# Patient Record
Sex: FEMALE | Race: WHITE | NOT HISPANIC OR LATINO
[De-identification: names, ages, dates, MRNs, and addresses within clinical notes are randomized per-mention and may not be internally consistent; named-entity substitution may affect disease eponyms.]

---

## 2019-08-16 PROBLEM — Z00.00 ENCOUNTER FOR PREVENTIVE HEALTH EXAMINATION: Status: ACTIVE | Noted: 2019-08-16

## 2019-09-04 ENCOUNTER — APPOINTMENT (OUTPATIENT)
Dept: OBGYN | Facility: CLINIC | Age: 44
End: 2019-09-04

## 2021-06-11 ENCOUNTER — TRANSCRIPTION ENCOUNTER (OUTPATIENT)
Age: 46
End: 2021-06-11

## 2022-01-31 ENCOUNTER — OUTPATIENT (OUTPATIENT)
Dept: OUTPATIENT SERVICES | Facility: HOSPITAL | Age: 47
LOS: 1 days | End: 2022-01-31
Payer: COMMERCIAL

## 2022-01-31 ENCOUNTER — APPOINTMENT (OUTPATIENT)
Dept: MAMMOGRAPHY | Facility: IMAGING CENTER | Age: 47
End: 2022-01-31
Payer: COMMERCIAL

## 2022-01-31 DIAGNOSIS — Z00.8 ENCOUNTER FOR OTHER GENERAL EXAMINATION: ICD-10-CM

## 2022-01-31 PROCEDURE — 76642 ULTRASOUND BREAST LIMITED: CPT | Mod: 26

## 2022-01-31 PROCEDURE — 76642 ULTRASOUND BREAST LIMITED: CPT

## 2022-01-31 PROCEDURE — 77066 DX MAMMO INCL CAD BI: CPT

## 2022-01-31 PROCEDURE — G0279: CPT

## 2022-02-02 ENCOUNTER — APPOINTMENT (OUTPATIENT)
Dept: ULTRASOUND IMAGING | Facility: IMAGING CENTER | Age: 47
End: 2022-02-02

## 2023-04-03 ENCOUNTER — APPOINTMENT (OUTPATIENT)
Dept: ORTHOPEDIC SURGERY | Facility: CLINIC | Age: 48
End: 2023-04-03
Payer: COMMERCIAL

## 2023-04-03 VITALS — HEIGHT: 58 IN | BODY MASS INDEX: 23.09 KG/M2 | WEIGHT: 110 LBS

## 2023-04-03 DIAGNOSIS — I10 ESSENTIAL (PRIMARY) HYPERTENSION: ICD-10-CM

## 2023-04-03 DIAGNOSIS — Z78.9 OTHER SPECIFIED HEALTH STATUS: ICD-10-CM

## 2023-04-03 DIAGNOSIS — S83.512A SPRAIN OF ANTERIOR CRUCIATE LIGAMENT OF LEFT KNEE, INITIAL ENCOUNTER: ICD-10-CM

## 2023-04-03 DIAGNOSIS — S83.232A COMPLEX TEAR OF MEDIAL MENISCUS, CURRENT INJURY, LEFT KNEE, INITIAL ENCOUNTER: ICD-10-CM

## 2023-04-03 PROCEDURE — 73564 X-RAY EXAM KNEE 4 OR MORE: CPT | Mod: LT

## 2023-04-03 PROCEDURE — 99204 OFFICE O/P NEW MOD 45 MIN: CPT

## 2023-04-03 RX ORDER — AMLODIPINE BESYLATE AND OLMESARTAN MEDOXOMIL 5; 20 MG/1; MG/1
5-20 TABLET, FILM COATED ORAL
Refills: 0 | Status: ACTIVE | COMMUNITY

## 2023-04-03 RX ORDER — LEVOTHYROXINE SODIUM 50 UG/1
50 TABLET ORAL
Refills: 0 | Status: ACTIVE | COMMUNITY

## 2023-04-04 NOTE — HISTORY OF PRESENT ILLNESS
[de-identified] : Patient is a 48 year old female here for left knee pain. Patient was skiing on 3/3/23 and had an accident, injuring the knee. Patient went to [facility] where they told her she had torn her meniscus. Patient states they were making her wait until 4/27 to perform surgery on the knee. Patient was told by her physical therapist at [facility]  to see Dr. Esparza today. Patient received an MRI for the knee on 3/8/23. Patient notes weakness in the knee. Patient notes no prior injury to the left knee.

## 2023-04-04 NOTE — DATA REVIEWED
[MRI] : MRI [Left] : left [Knee] : knee [Report was reviewed and noted in the chart] : The report was reviewed and noted in the chart [I independently reviewed and interpreted images and report] : I independently reviewed and interpreted images and report [I reviewed the films/CD] : I reviewed the films/CD [FreeTextEntry1] : 03.08.23 ( [facility]) - Complete tear of the ACL, complex posterior horn/body mmt, bone contusions of the lateral tibial plateau and LFC. mild djd.

## 2023-04-04 NOTE — PHYSICAL EXAM
[Left] : left knee [NL (0)] : extension 0 degrees [] : negative Patella apprehension [TWNoteComboBox7] : flexion 130 degrees

## 2023-04-04 NOTE — DISCUSSION/SUMMARY
[Medication Risks Reviewed] : Medication risks reviewed [Surgical risks reviewed] : Surgical risks reviewed [de-identified] : Complete tear of the ACL and complex mmt of the left knee s/p skiing injury on 03.03.23. Persistent pain and instability of the left knee.\par Would advise for continued use of the brace. \par \par risks and benefits of acl reconstruction discussed, explained its a major surgery, expect great outcome but real risks that include infection, graft failure/recurrent tearing, continued knee pain, inability to return to sports, graft site donor issues such as fracture, also discussed graft choices and risks of each and how growth plates are a factor but patient  is within our guidelines for bone patella bone which were established by jayme, risk of arthritis with meniscectomy and recurrent tearing 25% of time with repair, that myriad of poor events can even be limb threatening and need to adhere to our protocol to get desired results, wants to proceed.  Understand this is major surgery which can be limb threatening with unforeseen complications \par \par discussed risks and benefits of acl reconstruction as well as graft choices and options, understands despite well done surgery and good rehab there is a re tear rate of almost 20% in national studies, there is a risk of need for repeat surgery for the acl and meniscus as well as the devastating complication of infection, there are risks from havesting of the graft and we weigh these against the risks of allograft, while most athlets can return to sports , many cannot, this surgery requires the patient to be diligent in rehab, there are also risks of the anesthesia particularly the nerve block which can be discussed with anesthesia, there is a risk of hardware loosening and breakage and loss of fixation, the goal of surgery is a stable knee though i don’t attempt to produce natural anatomy as i think that has been key to our lower revision rates as taught by jayme \par \par The risks and benefits of surgery have been discussed. Risks include but are not limited to bleeding, infection, reaction to anesthesia, injury to blood vessels and nerves, malunion, nonunion, DVT, PE, necessity of repeat surgery, chronic pain, loss of limb and death. The patient understands the risks and agrees with the surgical plan. All questions have been answered.

## 2023-04-10 ENCOUNTER — APPOINTMENT (OUTPATIENT)
Age: 48
End: 2023-04-10
Payer: COMMERCIAL

## 2023-04-10 PROCEDURE — 29888 ARTHRS AID ACL RPR/AGMNTJ: CPT | Mod: AS,LT

## 2023-04-10 PROCEDURE — 20902 REMOVAL OF BONE FOR GRAFT: CPT | Mod: AS,59,LT

## 2023-04-10 PROCEDURE — 29879 ARTHRS KNE SRG ABRASJ ARTHRP: CPT | Mod: 59,LT

## 2023-04-10 PROCEDURE — 29879 ARTHRS KNE SRG ABRASJ ARTHRP: CPT | Mod: AS,59,LT

## 2023-04-10 PROCEDURE — 29881 ARTHRS KNE SRG MNISECTMY M/L: CPT | Mod: AS,59,LT

## 2023-04-10 PROCEDURE — 29881 ARTHRS KNE SRG MNISECTMY M/L: CPT | Mod: 59,LT

## 2023-04-10 PROCEDURE — 20902 REMOVAL OF BONE FOR GRAFT: CPT | Mod: 59,LT

## 2023-04-10 PROCEDURE — 29888 ARTHRS AID ACL RPR/AGMNTJ: CPT | Mod: LT

## 2023-04-17 ENCOUNTER — APPOINTMENT (OUTPATIENT)
Dept: ORTHOPEDIC SURGERY | Facility: CLINIC | Age: 48
End: 2023-04-17
Payer: COMMERCIAL

## 2023-04-17 VITALS — HEIGHT: 58 IN | WEIGHT: 110 LBS | BODY MASS INDEX: 23.09 KG/M2

## 2023-04-17 PROCEDURE — 99024 POSTOP FOLLOW-UP VISIT: CPT

## 2023-04-17 PROCEDURE — 73560 X-RAY EXAM OF KNEE 1 OR 2: CPT | Mod: LT

## 2023-04-17 NOTE — HISTORY OF PRESENT ILLNESS
[de-identified] : Patient is here for 1st post op visit from sx on 4/10/23 - right knee ACL repair with medial meniscectomy. No issues or concerns since sx. Some discomfort with rest. Takes Percocet as needed.

## 2023-04-17 NOTE — IMAGING
[Left] : left knee [Tunnels and hardware in proper position] : Tunnels and hardware in proper position

## 2023-04-25 ENCOUNTER — APPOINTMENT (OUTPATIENT)
Dept: ORTHOPEDIC SURGERY | Facility: CLINIC | Age: 48
End: 2023-04-25
Payer: COMMERCIAL

## 2023-04-25 VITALS — WEIGHT: 110 LBS | BODY MASS INDEX: 23.09 KG/M2 | HEIGHT: 58 IN

## 2023-04-25 PROCEDURE — 99024 POSTOP FOLLOW-UP VISIT: CPT

## 2023-04-27 NOTE — DISCUSSION/SUMMARY
[de-identified] : The patient is approximately 2 weeks s/p left knee ACL reconstruction and medial meniscectomy  (DOS: 4/10/23) - normal course with good progress and no evidence of infection. Incision sites are well approximated, clean, dry, intact, without drainage, without erythema. The patient is instructed in wound management. The patient's post-op plan, protocol and activity modifications have been thoroughly discussed and the patient expressed understanding. \par Continue use of brace.\par Follow up 2 weeks.

## 2023-04-27 NOTE — HISTORY OF PRESENT ILLNESS
[de-identified] : Post op on the left knee ACL from 4/10/23. Feeling well, has been going to PT as well without much concern. Some increased aching yesterday but has subsided. In brace as directed

## 2023-05-09 ENCOUNTER — APPOINTMENT (OUTPATIENT)
Dept: ORTHOPEDIC SURGERY | Facility: CLINIC | Age: 48
End: 2023-05-09
Payer: COMMERCIAL

## 2023-05-09 VITALS — HEIGHT: 58 IN | WEIGHT: 110 LBS | BODY MASS INDEX: 23.09 KG/M2

## 2023-05-09 PROCEDURE — 99024 POSTOP FOLLOW-UP VISIT: CPT

## 2023-05-12 NOTE — HISTORY OF PRESENT ILLNESS
[de-identified] : Here for post op on the left knee ACL repair 4/10/23. Feeling really well today. Has been going to PT as well but had been lagging a bit due to therapist getting sick and treating with another PT.

## 2023-05-12 NOTE — PHYSICAL EXAM
[Left] : left knee [4___] : quadriceps 4[unfilled]/5 [] : non-antalgic [TWNoteComboBox7] : flexion 130 degrees [de-identified] : extension 5 degrees

## 2023-05-12 NOTE — DISCUSSION/SUMMARY
[de-identified] : The patient is approximately 4 weeks s/p left knee ACL reconstruction and medial meniscectomy  (DOS: 4/10/23) - normal course with good progress and no evidence of infection. The patient's post-op plan, protocol and activity modifications have been thoroughly discussed and the patient expressed understanding. \par Patient may discontinue use of brace - advised to wear in large crowds as precautionary measure. \par Continue physical therapy - discussed the importance of pushing extension - demonstrates stretches \par Follow up 4 weeks.

## 2023-06-06 ENCOUNTER — NON-APPOINTMENT (OUTPATIENT)
Age: 48
End: 2023-06-06

## 2023-06-06 ENCOUNTER — APPOINTMENT (OUTPATIENT)
Dept: ORTHOPEDIC SURGERY | Facility: CLINIC | Age: 48
End: 2023-06-06
Payer: COMMERCIAL

## 2023-06-06 VITALS — HEIGHT: 58 IN | WEIGHT: 110 LBS | BODY MASS INDEX: 23.09 KG/M2

## 2023-06-06 PROCEDURE — 99024 POSTOP FOLLOW-UP VISIT: CPT

## 2023-06-11 NOTE — DISCUSSION/SUMMARY
[de-identified] : The patient is approximately 2 months s/p left knee ACL reconstruction and medial meniscectomy  (DOS: 4/10/23) - normal course with good progress and no evidence of infection. The patient's post-op plan, protocol and activity modifications have been thoroughly discussed and the patient expressed understanding. \par Continue physical therapy. \par Continue with straight line activity. \par Follow up 4 weeks for possible functional ACL brace

## 2023-06-11 NOTE — HISTORY OF PRESENT ILLNESS
[de-identified] : Pt is here for a PO visit of the left knee ACL recon and medial meniscectomy on 4/10/23. Pt notes an improvement in the left knee since last visit. Pt is going to [facility] three times a week which has been helping the left knee. Pt notes some swelling and discomfort in the left knee and thigh. Pt has been taking Ibuprofen for left knee pain.

## 2023-06-11 NOTE — PHYSICAL EXAM
[Left] : left knee [4___] : quadriceps 4[unfilled]/5 [] : non-antalgic [de-identified] : extension 0 degrees [TWNoteComboBox7] : flexion 130 degrees

## 2023-06-27 ENCOUNTER — APPOINTMENT (OUTPATIENT)
Dept: ORTHOPEDIC SURGERY | Facility: CLINIC | Age: 48
End: 2023-06-27
Payer: COMMERCIAL

## 2023-06-27 VITALS — WEIGHT: 110 LBS | HEIGHT: 58 IN | BODY MASS INDEX: 23.09 KG/M2

## 2023-06-27 PROCEDURE — L1852: CPT | Mod: LT

## 2023-06-27 PROCEDURE — 99024 POSTOP FOLLOW-UP VISIT: CPT

## 2023-07-04 NOTE — DISCUSSION/SUMMARY
[de-identified] : The patient is approximately 3 months s/p left knee ACL reconstruction and medial meniscectomy  (DOS: 4/10/23) - normal course with good progress and no evidence of infection. The patient's post-op plan, protocol and activity modifications have been thoroughly discussed and the patient expressed understanding. \par \par Continue physical therapy. \par \par The patient will begin use of functional ACL brace - fitted and dispensed in office today. Continue with straight line activity in brace. \par \par The patient is prescribed a functional ACL brace today for return to activities of daily living. This brace is indicated to protect the surgically repaired knee due to instability during the continued ligamentous healing process, and while the patient increases their activities of daily living. \par \par Follow up 4 weeks

## 2023-07-04 NOTE — PHYSICAL EXAM
[Left] : left knee [4___] : quadriceps 4[unfilled]/5 [] : non-antalgic [de-identified] : Solid lachman  [TWNoteComboBox7] : flexion 135 degrees [de-identified] : extension 0 degrees

## 2023-07-04 NOTE — HISTORY OF PRESENT ILLNESS
[de-identified] : Patient is here for a post op visit on the left knee. Patient notes the knee is feeling good and notes therapy is going well (liam felipe). Patient notes stairs are becoming normal again. 4/10/23 ACL reconstruction with medial meniscectomy

## 2023-07-25 ENCOUNTER — APPOINTMENT (OUTPATIENT)
Dept: ORTHOPEDIC SURGERY | Facility: CLINIC | Age: 48
End: 2023-07-25
Payer: COMMERCIAL

## 2023-07-25 VITALS — BODY MASS INDEX: 23.09 KG/M2 | WEIGHT: 110 LBS | HEIGHT: 58 IN

## 2023-07-25 PROCEDURE — 99213 OFFICE O/P EST LOW 20 MIN: CPT

## 2023-07-25 RX ORDER — OXYCODONE AND ACETAMINOPHEN 10; 325 MG/1; MG/1
10-325 TABLET ORAL
Qty: 40 | Refills: 0 | Status: DISCONTINUED | COMMUNITY
Start: 2023-04-10 | End: 2023-07-25

## 2023-07-28 NOTE — HISTORY OF PRESENT ILLNESS
[de-identified] : Pt is here for a follow up of the left knee. 4/10/23 ACL recon w medial meniscectomy. Left knee is feeling okay, going to [facility] (matteo). Has not started using functional brace at PT yet. Has not started running yet but can do straight line motions.

## 2023-07-28 NOTE — PHYSICAL EXAM
[Left] : left knee [NL (0)] : extension 0 degrees [4___] : quadriceps 4[unfilled]/5 [Negative] : negative anterior draw [] : non-antalgic [FreeTextEntry9] : Stable throughout ROM [de-identified] : improving  [TWNoteComboBox7] : flexion 135 degrees [de-identified] : False

## 2023-07-28 NOTE — DISCUSSION/SUMMARY
[de-identified] : The patient is approximately 3.5 months s/p left knee ACL reconstruction and medial meniscectomy  (DOS: 4/10/23) - normal course with good progress and no evidence of infection. The patient's post-op plan, protocol and activity modifications have been thoroughly discussed and the patient expressed understanding. \par \par Continue physical therapy. start light jogging in brace. Avoid any pivoting or cutting movements\par \par Continue use of functional knee brace. \par \par Follow up 6 weeks

## 2023-09-05 ENCOUNTER — APPOINTMENT (OUTPATIENT)
Dept: ORTHOPEDIC SURGERY | Facility: CLINIC | Age: 48
End: 2023-09-05
Payer: COMMERCIAL

## 2023-09-05 VITALS — WEIGHT: 110 LBS | BODY MASS INDEX: 23.09 KG/M2 | HEIGHT: 58 IN

## 2023-09-05 DIAGNOSIS — Z86.39 PERSONAL HISTORY OF OTHER ENDOCRINE, NUTRITIONAL AND METABOLIC DISEASE: ICD-10-CM

## 2023-09-05 DIAGNOSIS — M23.92 UNSPECIFIED INTERNAL DERANGEMENT OF LEFT KNEE: ICD-10-CM

## 2023-09-05 DIAGNOSIS — Z98.890 OTHER SPECIFIED POSTPROCEDURAL STATES: ICD-10-CM

## 2023-09-05 PROCEDURE — 99213 OFFICE O/P EST LOW 20 MIN: CPT

## 2023-09-06 PROBLEM — Z98.890 STATUS POST ANTERIOR CRUCIATE LIGAMENT SURGERY: Status: ACTIVE | Noted: 2023-04-17

## 2023-09-10 PROBLEM — M23.92 ACUTE INTERNAL DERANGEMENT OF LEFT KNEE: Status: ACTIVE | Noted: 2023-09-10

## 2023-09-10 NOTE — DISCUSSION/SUMMARY
[Medication Risks Reviewed] : Medication risks reviewed [Surgical risks reviewed] : Surgical risks reviewed [de-identified] : The patient is approximately 5 months s/p left knee ACL reconstruction and medial meniscectomy  (DOS: 4/10/23) - normal course with good progress and no evidence of infection. The patient's post-op plan, protocol and activity modifications have been thoroughly discussed and the patient expressed understanding.   The patient will continue physical therapy, recommended medical gym. Discussed the risks of recurrent injury considering her residual quad weakness.  Follow up 2 months

## 2023-09-10 NOTE — PHYSICAL EXAM
[Left] : left knee [NL (0)] : extension 0 degrees [4___] : quadriceps 4[unfilled]/5 [Negative] : negative anterior draw [NL (140)] : flexion 140 degrees [] : non-antalgic [FreeTextEntry9] : Stable and well balanced throughout ROM [de-identified] : no meniscal signs  [TWNoteComboBox7] : False